# Patient Record
(demographics unavailable — no encounter records)

---

## 2025-03-20 NOTE — PHYSICAL EXAM
[FreeTextEntry1] : Alert, NAD. Good eye contact. Obeyed commands. Heart sounds NL. Neck FROM. PERRL, EOMI, face symmetric, hearing grossly intact. Tone, power, gait NL. No nystagmus or tremor.

## 2025-03-20 NOTE — HISTORY OF PRESENT ILLNESS
[FreeTextEntry1] : 6 year old male with delayed speech and problems with focusing, staying on task and following directions. Teachers have also been concerned about the pt's social interactions, raising the question of possibly being on the autistic spectrum. Pt makes good eye contact and responds to name. Speaks in sentences now. Received ST since 18 months old. Walked at 1 yr old. NKA. On no meds. FMH -ve for epilepsy.  [facility] +ve for ASD in an uncle. Birth: FT C/S s/p phototherapy for jaundice. Pt currently in a regular KTG class with ST and OT.

## 2025-03-20 NOTE — CONSULT LETTER
[Dear  ___] : Dear  [unfilled], [Please see my note below.] : Please see my note below. [Sincerely,] : Sincerely, [FreeTextEntry1] : Thank you for sending  RADHA SEVERINO  to me for neurological evaluation. This is an initial encounter with a new pt. [FreeTextEntry3] : Dr Johnson